# Patient Record
Sex: MALE | Race: WHITE | URBAN - METROPOLITAN AREA
[De-identification: names, ages, dates, MRNs, and addresses within clinical notes are randomized per-mention and may not be internally consistent; named-entity substitution may affect disease eponyms.]

---

## 2022-07-12 ENCOUNTER — OFFICE VISIT (OUTPATIENT)
Dept: URBAN - METROPOLITAN AREA CLINIC 59 | Facility: CLINIC | Age: 62
End: 2022-07-12
Payer: COMMERCIAL

## 2022-07-12 DIAGNOSIS — H20.9 IRIDOCYCLITIS: Primary | ICD-10-CM

## 2022-07-12 PROCEDURE — 99203 OFFICE O/P NEW LOW 30 MIN: CPT | Performed by: OPTOMETRIST

## 2022-07-12 RX ORDER — PREDNISOLONE ACETATE 10 MG/ML
1 % SUSPENSION/ DROPS OPHTHALMIC
Qty: 5 | Refills: 3 | Status: ACTIVE
Start: 2022-07-12

## 2022-07-12 RX ORDER — ATROPINE SULFATE 10 MG/ML
1 % SOLUTION/ DROPS OPHTHALMIC
Qty: 5 | Refills: 0 | Status: INACTIVE
Start: 2022-07-12 | End: 2022-07-12

## 2022-07-12 RX ORDER — CYCLOPENTOLATE HYDROCHLORIDE 10 MG/ML
1 % SOLUTION/ DROPS OPHTHALMIC
Qty: 5 | Refills: 0 | Status: ACTIVE
Start: 2022-07-12

## 2022-07-12 ASSESSMENT — INTRAOCULAR PRESSURE
OD: 16
OS: 16

## 2022-07-12 NOTE — IMPRESSION/PLAN
Impression: Iridocyclitis: H20.9. OS Plan: Left eye. Discussed condition in detail with patient. Patient to start Pred q2hrs OS and start Cyclogyl BID OS. Patient to RTC on Friday for follow up.

## 2022-07-15 ENCOUNTER — OFFICE VISIT (OUTPATIENT)
Dept: URBAN - METROPOLITAN AREA CLINIC 59 | Facility: CLINIC | Age: 62
End: 2022-07-15
Payer: COMMERCIAL

## 2022-07-15 PROCEDURE — 99213 OFFICE O/P EST LOW 20 MIN: CPT | Performed by: OPTOMETRIST

## 2022-07-15 ASSESSMENT — INTRAOCULAR PRESSURE
OS: 16
OD: 12

## 2022-07-15 NOTE — IMPRESSION/PLAN
Impression: Iridocyclitis: H20.9. OS Plan: Feeling much better. Stable/slight improvement clinically. Discussed importance of  treating aggressively with very frequent drops. Patient to continue Pred Q2hrs and Cyclogyl BIS OS.

## 2022-07-22 ENCOUNTER — OFFICE VISIT (OUTPATIENT)
Dept: URBAN - METROPOLITAN AREA CLINIC 59 | Facility: CLINIC | Age: 62
End: 2022-07-22
Payer: COMMERCIAL

## 2022-07-22 DIAGNOSIS — H20.9 IRIDOCYCLITIS: Primary | ICD-10-CM

## 2022-07-22 PROCEDURE — 99213 OFFICE O/P EST LOW 20 MIN: CPT | Performed by: OPTOMETRIST

## 2022-07-22 RX ORDER — DUREZOL 0.5 MG/ML
0.05 % EMULSION OPHTHALMIC
Qty: 5 | Refills: 3 | Status: ACTIVE
Start: 2022-07-22

## 2022-07-22 ASSESSMENT — INTRAOCULAR PRESSURE
OS: 16
OD: 16

## 2022-07-22 NOTE — IMPRESSION/PLAN
Impression: Iridocyclitis: H20.9. OS Plan: Eye feeling better however no clinical improvement in [facility] cells after 2 weeks of Q2hr Prednisolone. Need to switch to stronger steroid. Recommend Durezol, may possibly need to get prior authorization for Durezol. Patient to use Durezol 6 times a day in the left eye and stop Pred. Discussed increased risks of steroid response with Durezol. Patient to continue Pred Q2hrs until starting Durezol. Patient to also continue Cyclogyl BID OS. Patient will be leaving next week for vacation. Patient to continue  drops while on vacation. Patient has an eye doctor in the town that he is visiting and instructed to see that doctor if any increased pain, redness or decreased vision. RTC here upon return from vacation.

## 2022-08-12 ENCOUNTER — OFFICE VISIT (OUTPATIENT)
Dept: URBAN - METROPOLITAN AREA CLINIC 59 | Facility: CLINIC | Age: 62
End: 2022-08-12
Payer: COMMERCIAL

## 2022-08-12 DIAGNOSIS — H20.9 IRIDOCYCLITIS: Primary | ICD-10-CM

## 2022-08-12 PROCEDURE — 99213 OFFICE O/P EST LOW 20 MIN: CPT | Performed by: OPTOMETRIST

## 2022-08-12 ASSESSMENT — INTRAOCULAR PRESSURE
OS: 16
OD: 16

## 2022-08-12 NOTE — IMPRESSION/PLAN
Impression: Iridocyclitis: H20.9. OS Plan: Improving, nearly resolved. Patient switched from Pred to Durezol 2 weeks ago. Patient to continue Durezol, tapering to QID OS x 4 days, TID OS x 4 days, BID OS x 4 days, QD OS x 4 days. If patient runs out of Durezol it is  okay to finish out taper with Pred if needed. Patient to stop Cyclogyl BID OS.

## 2022-09-02 ENCOUNTER — OFFICE VISIT (OUTPATIENT)
Dept: URBAN - METROPOLITAN AREA CLINIC 59 | Facility: CLINIC | Age: 62
End: 2022-09-02
Payer: COMMERCIAL

## 2022-09-02 DIAGNOSIS — H20.9 IRIDOCYCLITIS: ICD-10-CM

## 2022-09-02 DIAGNOSIS — H52.4 PRESBYOPIA: ICD-10-CM

## 2022-09-02 DIAGNOSIS — H25.13 AGE-RELATED NUCLEAR CATARACT, BILATERAL: Primary | ICD-10-CM

## 2022-09-02 PROCEDURE — 92014 COMPRE OPH EXAM EST PT 1/>: CPT | Performed by: OPTOMETRIST

## 2022-09-02 RX ORDER — DUREZOL 0.5 MG/ML
0.05 % EMULSION OPHTHALMIC
Qty: 5 | Refills: 3 | Status: INACTIVE
Start: 2022-09-02 | End: 2022-09-02

## 2022-09-02 RX ORDER — PREDNISOLONE ACETATE 10 MG/ML
1 % SUSPENSION/ DROPS OPHTHALMIC
Qty: 5 | Refills: 3 | Status: INACTIVE
Start: 2022-09-02 | End: 2022-09-02

## 2022-09-02 RX ORDER — CYCLOPENTOLATE HYDROCHLORIDE 10 MG/ML
1 % SOLUTION/ DROPS OPHTHALMIC
Qty: 5 | Refills: 0 | Status: INACTIVE
Start: 2022-09-02 | End: 2022-09-02

## 2022-09-02 ASSESSMENT — VISUAL ACUITY
OD: 20/20
OS: 20/20

## 2022-09-02 ASSESSMENT — INTRAOCULAR PRESSURE
OS: 16
OD: 16

## 2024-07-16 ENCOUNTER — OFFICE VISIT (OUTPATIENT)
Dept: URBAN - METROPOLITAN AREA CLINIC 59 | Facility: CLINIC | Age: 64
End: 2024-07-16
Payer: COMMERCIAL

## 2024-07-16 DIAGNOSIS — H25.13 AGE-RELATED NUCLEAR CATARACT, BILATERAL: Primary | ICD-10-CM

## 2024-07-16 DIAGNOSIS — H52.4 PRESBYOPIA: ICD-10-CM

## 2024-07-16 PROCEDURE — 92014 COMPRE OPH EXAM EST PT 1/>: CPT | Performed by: OPTOMETRIST

## 2024-07-16 ASSESSMENT — VISUAL ACUITY
OS: 20/20
OD: 20/20

## 2024-07-16 ASSESSMENT — INTRAOCULAR PRESSURE
OD: 9
OS: 11